# Patient Record
(demographics unavailable — no encounter records)

---

## 2024-11-20 NOTE — REVIEW OF SYSTEMS
[Headache] : no headache [Eye Discharge] : no eye discharge [Eye Redness] : no eye redness [Ear Pain] : no ear pain [Nasal Discharge] : nasal discharge [Nasal Congestion] : nasal congestion [Sore Throat] : no sore throat [Tachypnea] : not tachypneic [Wheezing] : wheezing [Cough] : cough [Shortness of Breath] : no shortness of breath [Negative] : Genitourinary

## 2024-11-20 NOTE — PHYSICAL EXAM
[Tachypnea] : no tachypnea [Belly Breathing] : no belly breathing [Subcostal Retractions] : no subcostal retractions [Suprasternal Retractions] : no suprasternal retractions [NL] : warm, clear [FreeTextEntry4] : nasal congestion [FreeTextEntry7] : diffuse, scattered expiratory wheeze, no increased work of breathing

## 2024-11-20 NOTE — DISCUSSION/SUMMARY
[FreeTextEntry1] : Anticipatory guidance and parent education given. Albuterol nebs every 4 hours as needed for cough/wheeze. Take oral steroid as prescribed. May use Tylenol or Ibuprofen as needed for fever or discomfort. Recommend supportive care including humidified air, increased fluids, rest, and nasal saline followed by nasal suction. Return if symptoms worsen or persist. F/U in 1 week. Consider inhaled steroids for fall/winter.

## 2024-11-20 NOTE — HISTORY OF PRESENT ILLNESS
[de-identified] : cough [FreeTextEntry6] : 7 year old girl with h/o intermittent asthma BIB mother with c/o cough and congestion for the past 4-5 days. Seen in urgent care 3 days ago and started on Albuterol every 4h as needed. Pt still with frequent, harsh cough. No fever. No SOB, difficulty breathing, chest pain, or stridor. No n/v/d. No headache, abdominal pain, sore throat or rash. No body aches or fatigue. No urinary complaints. Good po/uop/bm. Normal sleep and activity.

## 2025-06-02 NOTE — PHYSICAL EXAM
[Enlarged] : enlarged [Posterior Cervical] : posterior cervical [NL] : moves all extremities x4, warm, well perfused x4 [de-identified] :  confluent erythematous region of RT cheek, no vesicles, no excoriations, no overlying warmth, no significant tenderness to palpation

## 2025-06-02 NOTE — HISTORY OF PRESENT ILLNESS
[FreeTextEntry6] : 8 y/o with mother for concerns of facial rash. Mother states she felt yesterday pt with b/l redness of cheeks, now today redness on the right side. No new exposures. Denies rash elsewhere on the body. Rash is somewhat itchy. Denies pain to area. No preceding trauma or insect bite. No current URI symptoms although had cold about two weeks ago. Afebrile. Reports tolerating PO, no emesis, normal UOP, normal bowel movements. + Exposure to Fifths disease.

## 2025-06-02 NOTE — DISCUSSION/SUMMARY
[FreeTextEntry1] : RT sided facial rash, possible parvovirus or other viral exanthem. D/w parent parvovirus and viral illnesses and need for supportive care. D/w parent concerning symptoms such as increased warmth, tenderness/pain, development of persistent fever with rash.   Ibuprofen/acetaminophen as needed for fever/discomfort Supportive care: cool mist humidifier, increase hydration, honey for cough if develops Flonase/nasal saline for congestion Elevate head of bed Appropriate anticipatory guidance and education given; seek care if symptoms persist or worsen, or if w/dec UOP, signs of distress (reviewed w/parent signs of resp distress), inability to tolerate PO, persistent fever

## 2025-06-13 NOTE — DISCUSSION/SUMMARY
[FreeTextEntry1] : Anticipatory guidance and parent education given. Back of right earring uncovered with tweezers under sterile conditions. Right earring removed.  Area cleansed with alcohol and Bacitracin applied. Advise no earrings in right ear for the next 2 weeks or so until area is less inflamed. Tylenol or Ibuprofen as needed for discomfort. F/U if fever, increased pain, redness or discharge from site. Parent understands and agrees with plan.

## 2025-06-13 NOTE — PHYSICAL EXAM
[NL] : warm, clear [FreeTextEntry3] : earring backing embedded in right earlobe with surrounding erythema [FreeTextEntry7] : no increased work of breathing

## 2025-06-13 NOTE — HISTORY OF PRESENT ILLNESS
[de-identified] : earring stuck in ear [FreeTextEntry6] : 7y11m old girl BIB mother with c/o the back of pt's earring stuck in her right earlobe. Pt tightened earrings herself the other day. Mother was able to remove the left earring but not the right. No fever. No swelling or discharge from ear. Normal sleep and activity. Good po/uop/bm.